# Patient Record
Sex: MALE | Race: AMERICAN INDIAN OR ALASKA NATIVE
[De-identification: names, ages, dates, MRNs, and addresses within clinical notes are randomized per-mention and may not be internally consistent; named-entity substitution may affect disease eponyms.]

---

## 2018-06-10 ENCOUNTER — HOSPITAL ENCOUNTER (EMERGENCY)
Dept: HOSPITAL 25 - ED | Age: 43
Discharge: HOME | End: 2018-06-10
Payer: COMMERCIAL

## 2018-06-10 VITALS — DIASTOLIC BLOOD PRESSURE: 94 MMHG | SYSTOLIC BLOOD PRESSURE: 150 MMHG

## 2018-06-10 DIAGNOSIS — R00.2: Primary | ICD-10-CM

## 2018-06-10 DIAGNOSIS — R07.9: ICD-10-CM

## 2018-06-10 LAB
BASOPHILS # BLD AUTO: 0.1 10^3/UL (ref 0–0.2)
EOSINOPHIL # BLD AUTO: 0 10^3/UL (ref 0–0.6)
HCT VFR BLD AUTO: 44 % (ref 42–52)
HGB BLD-MCNC: 15.1 G/DL (ref 14–18)
INR PPP/BLD: 1.01 (ref 0.77–1.02)
LYMPHOCYTES # BLD AUTO: 1 10^3/UL (ref 1–4.8)
MCH RBC QN AUTO: 30 PG (ref 27–31)
MCHC RBC AUTO-ENTMCNC: 35 G/DL (ref 31–36)
MCV RBC AUTO: 86 FL (ref 80–94)
MONOCYTES # BLD AUTO: 0.5 10^3/UL (ref 0–0.8)
NEUTROPHILS # BLD AUTO: 5.9 10^3/UL (ref 1.5–7.7)
NRBC # BLD AUTO: 0 10^3/UL
NRBC BLD QL AUTO: 0.2
PLATELET # BLD AUTO: 253 10^3/UL (ref 150–450)
RBC # BLD AUTO: 5.12 10^6/UL (ref 4–5.4)
WBC # BLD AUTO: 7.4 10^3/UL (ref 3.5–10.8)

## 2018-06-10 PROCEDURE — 83605 ASSAY OF LACTIC ACID: CPT

## 2018-06-10 PROCEDURE — 71045 X-RAY EXAM CHEST 1 VIEW: CPT

## 2018-06-10 PROCEDURE — 83735 ASSAY OF MAGNESIUM: CPT

## 2018-06-10 PROCEDURE — 84484 ASSAY OF TROPONIN QUANT: CPT

## 2018-06-10 PROCEDURE — 80053 COMPREHEN METABOLIC PANEL: CPT

## 2018-06-10 PROCEDURE — 81003 URINALYSIS AUTO W/O SCOPE: CPT

## 2018-06-10 PROCEDURE — 83880 ASSAY OF NATRIURETIC PEPTIDE: CPT

## 2018-06-10 PROCEDURE — 82553 CREATINE MB FRACTION: CPT

## 2018-06-10 PROCEDURE — 93005 ELECTROCARDIOGRAM TRACING: CPT

## 2018-06-10 PROCEDURE — 84443 ASSAY THYROID STIM HORMONE: CPT

## 2018-06-10 PROCEDURE — 85730 THROMBOPLASTIN TIME PARTIAL: CPT

## 2018-06-10 PROCEDURE — 84436 ASSAY OF TOTAL THYROXINE: CPT

## 2018-06-10 PROCEDURE — 99283 EMERGENCY DEPT VISIT LOW MDM: CPT

## 2018-06-10 PROCEDURE — 85610 PROTHROMBIN TIME: CPT

## 2018-06-10 PROCEDURE — 82550 ASSAY OF CK (CPK): CPT

## 2018-06-10 PROCEDURE — 36415 COLL VENOUS BLD VENIPUNCTURE: CPT

## 2018-06-10 PROCEDURE — 85025 COMPLETE CBC W/AUTO DIFF WBC: CPT

## 2018-06-10 NOTE — RAD
INDICATION: Chest pain and palpitations



COMPARISON: None

 

TECHNIQUE: Single AP portable view of the chest was obtained.



FINDINGS: 



Image quality is compromised due to the relative inferiority of a portable chest x-ray.



The heart and mediastinum exhibit normal size and contour.



The lungs are grossly clear. There is no evidence of a large pleural effusion.



Visualized bones are normal for the patient's age.



IMPRESSION:  No radiographic evidence for acute cardiopulmonary abnormality on this

portable chest x-ray.

## 2018-06-11 NOTE — ED
Corrie CASTILLO Gabriel, scribed for Sarah Willson MD on 06/10/18 at 1607 .





Palpitations / Dysrhythmia





- HPI Summary


HPI Summary: 





This patient is a 42 year old M BIBA to [facility] accompanied by his wife with a 

chief complaint of palpitations that occurred at 1300 today and lasted 5-10 

minutes. The patient has been wearing a Holter monitor for three weeks that was 

ordered through [facility] by his family practice PCP. Today the 

patient was jogging at 1300 when he began having hard beats that were 

accompanied by pain as well as a feeling of racing which is unusual for him. He 

states he usually does not have a series of painful beats just a single one and 

this is why he has been wearing a Holter monitor. The patient rates the pain 0/

10 in severity at this time. Pt runs twice a week but states the palpitations 

do not occur every run. The beats are not associated with exertion and will 

happen at rest. He has not seen a cardiologist for this issue yet, is scheduled 

to see Dr. Camp after the Holter monitor is completed. 





- History of Current Complaint


Chief Complaint: EDDysrhythmPalp


Time Seen by Provider: 06/10/18 14:38


Hx Obtained From: Patient, Family/Caretaker, EMS


Onset/Duration: Sudden Onset - today while running, Lasting Hours - today, but 

has had palpitations for weeks, Still Present


Timing: Constant


Severity Initially: Moderate


Severity Currently: None


Character: Pounding


Aggravating: Exertion


Alleviating: Nothing


Associated Signs & Symptoms: Negative





- Allergy/Home Medications


Allergies/Adverse Reactions: 


 Allergies











Allergy/AdvReac Type Severity Reaction Status Date / Time


 


No Known Allergies Allergy   Verified 06/10/18 14:23














PMH/Surg Hx/FS Hx/Imm Hx


Previously Healthy: Yes


Cardiovascular History: 


   Denies: Hx Cardiomegaly, Hx Congestive Heart Failure


Respiratory History: 


   Denies: Hx Chronic Obstructive Pulmonary Disease (COPD), Hx Sleep Apnea


GI History: 


   Denies: Hx Gastroesophageal Reflux Disease


 History: 


   Denies: Hx Benign Prostatic Hyperplasia


Neurological History: 


   Denies: Hx Dementia, Hx Developmental Delay


Psychiatric History: 


   Denies: Hx Inpatient Treatment





- Surgical History


Surgery Procedure, Year, and Place: none


Infectious Disease History: No


Infectious Disease History: 


   Denies: Traveled Outside the [place] in Last 30 Days





- Family History


Known Family History: 


   Negative: Seizure Disorder, Blood Disorder





- Social History


Lives: With Family


Alcohol Use: Weekly


Substance Use Type: Reports: None


Smoking Status (MU): Never Smoked Tobacco





Review of Systems


Constitutional: Negative


Positive: Palpitations, Chest Pain


Respiratory: Negative


Gastrointestinal: Negative


Musculoskeletal: Negative


Skin: Negative


Neurological: Negative


Negative: Slurred Speech


Psychological: Normal


All Other Systems Reviewed And Are Negative: Yes





Physical Exam





- Summary


Physical Exam Summary: 








Appearance: Well-appearing, moderate pain distress, well-nourished


Skin: Warm, color reflects adequate perfusion, dry


Head: Normal Head/Face inspection, atraumatic


Eyes: Conjunctiva clear


ENT: Normal inspection


Neck: Supple, no nodes, no JVD


Respiratory: Lungs clear, normal breath sounds, no respiratory distress


Cardio: RRR, No murmur, pulses normal, brisk capillary refill, hypertensive


Abdomen: Soft, nontender


Bowel sounds: Present 


Musculoskeletal: Strength Intact/ROM intact, no calf tenderness, no edema.


Psychological: Normal


Neuro: Alert, muscle tone normal, no focal deficit


 





Triage Information Reviewed: Yes


Vital Signs On Initial Exam: 


 Initial Vitals











Temp Pulse Resp BP Pulse Ox


 


 99.3 F   95   16   134/95   100 


 


 06/10/18 14:26  06/10/18 14:26  06/10/18 14:26  06/10/18 14:26  06/10/18 14:26











Vital Signs Reviewed: Yes





Diagnostics





- Vital Signs


 Vital Signs











  Temp Pulse Resp BP Pulse Ox


 


 06/10/18 15:50    10  


 


 06/10/18 14:26  99.3 F  95  16  134/95  100














- Laboratory


Lab Results: 


 Lab Results











  06/10/18 06/10/18 06/10/18 Range/Units





  14:52 14:52 14:52 


 


WBC  7.4    (3.5-10.8)  10^3/ul


 


RBC  5.12    (4.0-5.4)  10^6/ul


 


Hgb  15.1    (14.0-18.0)  g/dl


 


Hct  44    (42-52)  %


 


MCV  86    (80-94)  fL


 


MCH  30    (27-31)  pg


 


MCHC  35    (31-36)  g/dl


 


RDW  12    (10.5-15)  %


 


Plt Count  253    (150-450)  10^3/ul


 


MPV  7.2 L    (7.4-10.4)  um3


 


Neut % (Auto)  78.9    (38-83)  %


 


Lymph % (Auto)  14.0 L    (25-47)  %


 


Mono % (Auto)  6.2    (0-7)  %


 


Eos % (Auto)  0.2    (0-6)  %


 


Baso % (Auto)  0.7    (0-2)  %


 


Absolute Neuts (auto)  5.9    (1.5-7.7)  10^3/ul


 


Absolute Lymphs (auto)  1.0    (1.0-4.8)  10^3/ul


 


Absolute Monos (auto)  0.5    (0-0.8)  10^3/ul


 


Absolute Eos (auto)  0    (0-0.6)  10^3/ul


 


Absolute Basos (auto)  0.1    (0-0.2)  10^3/ul


 


Absolute Nucleated RBC  0    10^3/ul


 


Nucleated RBC %  0.2    


 


INR (Anticoag Therapy)     (0.77-1.02)  


 


APTT     (26.0-36.3)  seconds


 


Sodium   139   (139-145)  mmol/L


 


Potassium   3.6   (3.5-5.0)  mmol/L


 


Chloride   107   (101-111)  mmol/L


 


Carbon Dioxide   22   (22-32)  mmol/L


 


Anion Gap   10   (2-11)  mmol/L


 


BUN   9   (6-24)  mg/dL


 


Creatinine   0.97   (0.67-1.17)  mg/dL


 


Est GFR ( Amer)   109.2   (>60)  


 


Est GFR (Non-Af Amer)   84.9   (>60)  


 


BUN/Creatinine Ratio   9.3   (8-20)  


 


Glucose   108 H   ()  mg/dL


 


Lactic Acid    1.9  (0.5-2.0)  mmol/L


 


Calcium   9.3   (8.6-10.3)  mg/dL


 


Magnesium   1.9   (1.9-2.7)  mg/dL


 


Total Bilirubin   0.50   (0.2-1.0)  mg/dL


 


AST   14   (13-39)  U/L


 


ALT   18   (7-52)  U/L


 


Alkaline Phosphatase   54   ()  U/L


 


Total Creatine Kinase   59   ()  U/L


 


CK-MB (CK-2)   0.7   (0.6-6.3)  ng/mL


 


Troponin I   0.00   (<0.04)  ng/mL


 


B-Natriuretic Peptide    ( - 100) pg/mL


 


Total Protein   7.1   (6.4-8.9)  g/dL


 


Albumin   4.3   (3.2-5.2)  g/dL


 


Globulin   2.8   (2-4)  g/dL


 


Albumin/Globulin Ratio   1.5   (1-3)  


 


TSH   Pending   


 


Thyroxine (T4)   8.72   (6.09-12.23)  mcg/mL














  06/10/18 06/10/18 Range/Units





  14:52 14:52 


 


WBC    (3.5-10.8)  10^3/ul


 


RBC    (4.0-5.4)  10^6/ul


 


Hgb    (14.0-18.0)  g/dl


 


Hct    (42-52)  %


 


MCV    (80-94)  fL


 


MCH    (27-31)  pg


 


MCHC    (31-36)  g/dl


 


RDW    (10.5-15)  %


 


Plt Count    (150-450)  10^3/ul


 


MPV    (7.4-10.4)  um3


 


Neut % (Auto)    (38-83)  %


 


Lymph % (Auto)    (25-47)  %


 


Mono % (Auto)    (0-7)  %


 


Eos % (Auto)    (0-6)  %


 


Baso % (Auto)    (0-2)  %


 


Absolute Neuts (auto)    (1.5-7.7)  10^3/ul


 


Absolute Lymphs (auto)    (1.0-4.8)  10^3/ul


 


Absolute Monos (auto)    (0-0.8)  10^3/ul


 


Absolute Eos (auto)    (0-0.6)  10^3/ul


 


Absolute Basos (auto)    (0-0.2)  10^3/ul


 


Absolute Nucleated RBC    10^3/ul


 


Nucleated RBC %    


 


INR (Anticoag Therapy)  1.01   (0.77-1.02)  


 


APTT  30.1   (26.0-36.3)  seconds


 


Sodium    (139-145)  mmol/L


 


Potassium    (3.5-5.0)  mmol/L


 


Chloride    (101-111)  mmol/L


 


Carbon Dioxide    (22-32)  mmol/L


 


Anion Gap    (2-11)  mmol/L


 


BUN    (6-24)  mg/dL


 


Creatinine    (0.67-1.17)  mg/dL


 


Est GFR ( Amer)    (>60)  


 


Est GFR (Non-Af Amer)    (>60)  


 


BUN/Creatinine Ratio    (8-20)  


 


Glucose    ()  mg/dL


 


Lactic Acid    (0.5-2.0)  mmol/L


 


Calcium    (8.6-10.3)  mg/dL


 


Magnesium    (1.9-2.7)  mg/dL


 


Total Bilirubin    (0.2-1.0)  mg/dL


 


AST    (13-39)  U/L


 


ALT    (7-52)  U/L


 


Alkaline Phosphatase    ()  U/L


 


Total Creatine Kinase    ()  U/L


 


CK-MB (CK-2)    (0.6-6.3)  ng/mL


 


Troponin I    (<0.04)  ng/mL


 


B-Natriuretic Peptide   17 ( - 100) pg/mL


 


Total Protein    (6.4-8.9)  g/dL


 


Albumin    (3.2-5.2)  g/dL


 


Globulin    (2-4)  g/dL


 


Albumin/Globulin Ratio    (1-3)  


 


TSH    


 


Thyroxine (T4)    (6.09-12.23)  mcg/mL











Result Diagrams: 


 06/10/18 14:52





 06/10/18 14:52


Lab Statement: Any lab studies that have been ordered have been reviewed, and 

results considered in the medical decision making process.





- Radiology


  ** CXR


Radiology Interpretation Completed By: Radiologist - No radiographic evidence 

for acute cardiopulmonary abnormality on this portable chest x-ray.  ED 

physician has reviewed this radiology report.





- EKG


  ** 1848


Cardiac Rate: NL


EKG Rhythm: Sinus Rhythm - at 93 BPM


ST Segment: Non-Specific


Ectopy: None


EKG Interpretation: axis -22, nml AV/IV CT, nml QTc 


EKG Comparison: Other - no prior to compare





Re-Evaluation





- Re-Evaluation


  ** First Eval


Re-Evaluation Time: 18:50


Change: Improved


Comment: still feels occasional "hard" palpitations. Advised that labs show no 

significant abnormality and that we discussed with Dr. Mosley.  I contacted the 

number that is on pt's Holter monitor to learn what rhythm was displayed when 

pt had these symptoms today while running today approximately 1-2pm, but the 

call was not answered or returned in the time pt was in the ED. Reassured that 

no evidence of emergency condition is apparent at this time. Advised to 

continue wearing Holter monitor and follow up with Dr. Camp as scheduled.





Course/Dx





- Course


Assessment/Plan: An EKG reveals NSR at 93 axis -22, nml AV/IV CT, nml QTc, and 

nml axis.  .  CXR reveals, per radiologist, No radiographic evidence for acute 

cardiopulmonary abnormality on this.  portable chest x-ray.  Blood work and UA 

obtained.  No acute abnormality apparent at this time.  Patient will be 

discharged.  The patient is agreeable with this plan.  





- Diagnoses


Differential Diagnosis/HQI/PQRI: Positive: Cardiomyopathy, Coronary Artery 

Disease, Hypokalemia, Medication Induced, Myocarditis


Provider Diagnoses: 


 Palpitations








- Physician Notifications


Discussed Care Of Patient With: Patrick Mosley


Time Discussed With Above Provider: 18:46


Instructed by Provider To: Other - He suggested calling the number on the 

Holter monitor, but if unable to obtain hx of what the rhythm was, pt may be 

discharged.





Discharge





- Sign-Out/Discharge


Documenting (check all that apply): Discharge/Admit/Transfer





- Discharge Plan


Condition: Stable


Disposition: HOME


Patient Education Materials:  Heart Palpitations (ED)


Referrals: 


Penny Maurer MD [Primary Care Provider] - 2 Days


Additional Instructions: 


RETURN TO THE ER FOR ANY NEW OR WORSENING SYMPTOMS 





- Billing Disposition and Condition


Condition: STABLE


Disposition: Home





The documentation as recorded by the Corrie perrin Gabriel accurately reflects 

the service I personally performed and the decisions made by me, Sarah Willson MD.